# Patient Record
Sex: MALE | Race: WHITE | NOT HISPANIC OR LATINO | Employment: STUDENT | URBAN - METROPOLITAN AREA
[De-identification: names, ages, dates, MRNs, and addresses within clinical notes are randomized per-mention and may not be internally consistent; named-entity substitution may affect disease eponyms.]

---

## 2023-08-22 ENCOUNTER — OFFICE VISIT (OUTPATIENT)
Dept: URGENT CARE | Facility: URGENT CARE | Age: 33
End: 2023-08-22
Payer: COMMERCIAL

## 2023-08-22 VITALS
SYSTOLIC BLOOD PRESSURE: 127 MMHG | OXYGEN SATURATION: 99 % | TEMPERATURE: 97.6 F | HEART RATE: 79 BPM | WEIGHT: 237.9 LBS | DIASTOLIC BLOOD PRESSURE: 84 MMHG | RESPIRATION RATE: 16 BRPM

## 2023-08-22 DIAGNOSIS — L08.9 LOCAL INFECTION OF SKIN AND SUBCUTANEOUS TISSUE: Primary | ICD-10-CM

## 2023-08-22 PROCEDURE — 99203 OFFICE O/P NEW LOW 30 MIN: CPT | Performed by: PHYSICIAN ASSISTANT

## 2023-08-22 RX ORDER — DOXYCYCLINE 100 MG/1
100 CAPSULE ORAL 2 TIMES DAILY
Qty: 20 CAPSULE | Refills: 0 | Status: SHIPPED | OUTPATIENT
Start: 2023-08-22 | End: 2023-09-01

## 2023-08-23 NOTE — PROGRESS NOTES
ASSESSMENT/PLAN:    (L08.9) Local infection of skin and subcutaneous tissue  (primary encounter diagnosis)  MDM: Upper extremity erythematous patch, with warmth, of uncertain etiology.  Patient reportedly did note a spider in his bed prior to onset of the above.  No known tick bites or other known provocation.  Currently no associated systemic symptoms.  Past history of MRSA.  Plan: doxycycline monohydrate (MONODOX) 100 MG         capsule          Plan:     -Doxycycline antibiotic is prescribed  -Clean soap and water wash to area at least once daily  -Follow-up with primary care team if no improvement after 3-4 days of antibiotics, if any spreading/worsening, or if patient develops any acute illness symptoms  -Criteria for urgent and emergent follow-up were reviewed with patient and his wife today  -Skin infection educational handout is placed in MyChart  -Lyme disease educational handout is placed in MyChart.  Patient and I have fairly low suspicion for tickborne illness, as he is unable to identify any likely exposure.  However, we did discussed doxycycline is typically used to treat Lyme and tickborne disease.  I also educated patient the duration of treatment for tickborne illness is much longer than the 10-day course he has been prescribed today.  If he has any further concerns for potential tickborne disease, he is encouraged to follow-up with primary care team to discuss testing and to discuss potential prolonged antibiotic treatment.          This progress note has been dictated, with use of voice recognition software. Any grammatical, typographical, or context errors are unintentional and inherent to use of voice recognition software.  --------      Chief Complaint   Patient presents with    Insect Bites     34 yo M presents w/ the following complaint  spider/ bug/ tick bite left upper arm. 1st noticed on Sunday Bullseye getting bigger also complaining of headache Pt is a  cannot take anything that  "makes him drowsy     Edward Napoles is a 33-year-old male (who is a  by way of occupation) presenting to urgent care today for evaluation of a patch of red, warm skin on left upper arm he first noted on Sunday (now 2 days ago).    HPI: Patient tells me he and his wife noted 3 punctate marks in the center of what looked \"more like a mosquito bite\".  Over the following 24 hours, the area of redness has spread-prompting today's visit.  Of note, patient states he did notice a spider in his bed before onset of the above.  Patient states he has not had any known tick bites on his person the summer.  No other history of injury or trauma to this area.    Of note: Patient does have a past history of MRSA infection      ROS:   CONSTITUTIONAL: No acute onset fever,chills or severe weakenss  CARDIAC: No acute onset chest pain or shortness of breath   RESP: No acute onset cough, chest pain or shortness of breath   GI: No acute onset abdominal pain. No acute onset nausea, vomiting or diarrhea   SKIN: positive as per HPI. No acute  rash or lesions elsewhere   NEURO: Patient reports he did have a migraine headache yesterday (but tells me he has a history of migraine headaches).  No headache now.  No associated neck stiffness, photophobia, or lethargy.    RHEUM: No associated acute onset red, warm or swollen joints  MUS/SKEL: No acute onset upper extremity muscle weakness, numbness or tingling since onset of above skin rash       No past medical history on file.    There is no problem list on file for this patient.      No current outpatient medications on file.     No current facility-administered medications for this visit.     No Known Allergies            OBJECTIVE:  /84   Pulse 79   Temp 97.6  F (36.4  C)   Resp 16   Wt 107.9 kg (237 lb 14.4 oz)   SpO2 99%     General appearance: alert and no apparent distress  SKIN: Positive for an approximately 4 cm area of warm, erythematous skin on upper extremity.  " Surrounding skin is unremarkable.  I agree with patient and wife, they are is at least 1--perhaps a couple--of punctate marks in the center of erythematous tissue.  There are no fluctuant pockets to incise or drain today.  HEENT:   Conjunctiva not injected.  Sclera clear.  Oropharyngeal exam is normal: no lesions, erythema, adenopathy or exudate.  NECK: Trachea is midline. Neck is supple with no adenopathy  CARDIAC:NORMAL - regular rate and rhythm without murmur.  RESP: Normal - CTA without rales, rhonchi, or wheezing.  NEURO: Alert and oriented.  Normal speech and mentation.  CN II/XII grossly intact.  Gait within normal limits.

## 2023-08-24 NOTE — PATIENT INSTRUCTIONS
Urgent Care Plan-    -Doxycycline antibiotic is prescribed  -Clean soap and water wash to area at least once daily  -Follow-up with primary care team if no improvement after 3-4 days of antibiotics, if any spreading/worsening, or if patient develops any acute illness symptoms  -Criteria for urgent and emergent follow-up were reviewed with patient and his wife today  -Skin infection educational handout is placed in MyChart  -Lyme disease educational handout is placed in MyChart.  Patient and I have fairly low suspicion for tickborne illness, as he is unable to identify any likely exposure.  However, we did discussed doxycycline is typically used to treat Lyme and tickborne disease.  I also educated patient the duration of treatment for tickborne illness is much longer than the 10-day course he has been prescribed today.  If he has any further concerns for potential tickborne disease, he is encouraged to follow-up with primary care team to discuss testing and to discuss potential prolonged antibiotic treatment.

## 2024-05-12 ENCOUNTER — HOSPITAL ENCOUNTER (EMERGENCY)
Facility: CLINIC | Age: 34
Discharge: HOME | End: 2024-05-12
Attending: EMERGENCY MEDICINE | Admitting: EMERGENCY MEDICINE
Payer: COMMERCIAL

## 2024-05-12 VITALS
WEIGHT: 220 LBS | HEIGHT: 74 IN | SYSTOLIC BLOOD PRESSURE: 124 MMHG | HEART RATE: 79 BPM | BODY MASS INDEX: 28.23 KG/M2 | OXYGEN SATURATION: 96 % | RESPIRATION RATE: 16 BRPM | TEMPERATURE: 98 F | DIASTOLIC BLOOD PRESSURE: 88 MMHG

## 2024-05-12 DIAGNOSIS — T23.252A PARTIAL THICKNESS BURN OF PALM OF LEFT HAND, INITIAL ENCOUNTER: ICD-10-CM

## 2024-05-12 PROCEDURE — 99283 EMERGENCY DEPT VISIT LOW MDM: CPT

## 2024-05-12 PROCEDURE — 250N000009 HC RX 250: Performed by: EMERGENCY MEDICINE

## 2024-05-12 PROCEDURE — 250N000013 HC RX MED GY IP 250 OP 250 PS 637: Performed by: EMERGENCY MEDICINE

## 2024-05-12 RX ORDER — GINSENG 100 MG
CAPSULE ORAL ONCE
Status: COMPLETED | OUTPATIENT
Start: 2024-05-12 | End: 2024-05-12

## 2024-05-12 RX ORDER — IBUPROFEN 600 MG/1
600 TABLET, FILM COATED ORAL ONCE
Status: COMPLETED | OUTPATIENT
Start: 2024-05-12 | End: 2024-05-12

## 2024-05-12 RX ORDER — ACETAMINOPHEN 325 MG/1
650 TABLET ORAL ONCE
Status: COMPLETED | OUTPATIENT
Start: 2024-05-12 | End: 2024-05-12

## 2024-05-12 RX ADMIN — IBUPROFEN 600 MG: 600 TABLET, FILM COATED ORAL at 00:28

## 2024-05-12 RX ADMIN — BACITRACIN ZINC: 500 OINTMENT TOPICAL at 00:28

## 2024-05-12 RX ADMIN — ACETAMINOPHEN 650 MG: 325 TABLET, FILM COATED ORAL at 00:28

## 2024-05-12 ASSESSMENT — COLUMBIA-SUICIDE SEVERITY RATING SCALE - C-SSRS
2. HAVE YOU ACTUALLY HAD ANY THOUGHTS OF KILLING YOURSELF IN THE PAST MONTH?: NO
6. HAVE YOU EVER DONE ANYTHING, STARTED TO DO ANYTHING, OR PREPARED TO DO ANYTHING TO END YOUR LIFE?: NO
1. IN THE PAST MONTH, HAVE YOU WISHED YOU WERE DEAD OR WISHED YOU COULD GO TO SLEEP AND NOT WAKE UP?: NO

## 2024-05-12 NOTE — ED TRIAGE NOTES
Pt grabbed a hot drill bit at 2130. Pt has burn to palm of his left hand and left middle finger

## 2024-05-12 NOTE — DISCHARGE INSTRUCTIONS
Use Tylenol and/or Ibuprofen for pain or discomfort      Bacitracin 2 times daily until improved.